# Patient Record
(demographics unavailable — no encounter records)

---

## 2024-10-09 NOTE — HISTORY OF PRESENT ILLNESS
[FreeTextEntry1] : Pt is here for follow up of multiple medical problems including Afib  anxiety and depression,   GERD,   IBS.

## 2024-12-19 NOTE — HISTORY OF PRESENT ILLNESS
[FreeTextEntry1] : eczema, spots [de-identified] : BENJAMIN ANGEL is a 71 year old F who present for f/u as below.   #Eczema, on/off flares, using mometasone PRN #Acne scarring on cheeks, pt bothered by textural changes.  #Asking about marionette line filler #FBSE.  Spots scattered on body x years. Asymptomatic and unchanged. No alleviating/aggravating factors. Never been treated.    Personal hx of skin cancer: no FHx of skin cancer: no Social hx: retired

## 2024-12-19 NOTE — PHYSICAL EXAM
[Alert] : alert [Oriented x 3] : ~L oriented x 3 [Well Nourished] : well nourished [Conjunctiva Non-injected] : conjunctiva non-injected [No Visual Lymphadenopathy] : no visual  lymphadenopathy [No Clubbing] : no clubbing [No Edema] : no edema [No Bromhidrosis] : no bromhidrosis [No Chromhidrosis] : no chromhidrosis [Full Body Skin Exam Performed] : performed [FreeTextEntry3] : General: Alert and oriented, in NAD.  All of the following were examined and were within normal limits, except as noted:   Scalp: Face, including eyelids, nose, lips, ears, oropharynx: Neck: Chest/Back/Abdomen: Bilateral Arms/Hands: Bilateral Legs/Feet: Buttocks, Genitalia, Anus/perineum:  	 Hair, Nails, Oral Mucosa, Eyes:    acneiform scars with textural changes bl cheeks eczematous thin plaques on R arm and posterior thighs stuck on waxy brown papules  red papules

## 2024-12-19 NOTE — ASSESSMENT
[FreeTextEntry1] : #Acne scarring, bl cheeks - chronic; flaring #Rhytides/skincare/textural changes - Diagnosis, chronic nature, disease course, treatment options and goals of therapy discussed - Start tretinoin 0.025% cream qhs as tolerated. SED, moisturize for dryness - Discussed Fraxel resurfacing; pt defers due to OOP cost  #Eczema /atopic derm - chronic; flaring #Xerosis Cutis - Diagnosis, chronic nature, disease course, treatment options and goals of therapy discussed - Moisturize whole body 2-3x daily with thick cream (rather than lotion). Examples include OTC La Roche Posay, Vanicream, Cetaphil, Cerave - Continue mometasone ointment BID PRN x2 weeks on, 1 week off cycles. SED, avoid on face  #Seborrheic Keratosis - These growths are benign - Related to genetics - these lesions run in families; NOT related to sun exposure - No treatment warranted unless inflamed; can use OTC Sarna lotion PRN itch  #Angiomas - Clinically benign red or purple blood vessel growths - No treatment warranted  #Screening exam for skin cancer - no suspicious lesions on exam today - TBSE performed today - Advised sun protection. Recommended OTC sunscreen products (EltaMD/Neutrogena/La Roche Posay), including SPF30+ with broadband UV protection as well as proper use. Discussed OTC sun protective clothing - Counseled patient to monitor for changes, including mole monitoring and self-skin exams

## 2025-02-07 NOTE — PHYSICAL EXAM
[Alert] : alert [Normal Voice/Communication] : normal voice/communication [Healthy Appearing] : healthy appearing [Sclera] : the sclera and conjunctiva were normal [Hearing Threshold Finger Rub Not [place]] : hearing was normal [Normal Lips/Gums] : the lips and gums were normal [Normal Appearance] : the appearance of the neck was normal [No Respiratory Distress] : no respiratory distress [Auscultation Breath Sounds / Voice Sounds] : lungs were clear to auscultation bilaterally [Heart Rate And Rhythm] : heart rate was normal and rhythm regular [Normal S1, S2] : normal S1 and S2 [Bowel Sounds] : normal bowel sounds [Abdomen Tenderness] : non-tender [Abnormal Walk] : normal gait [Abdomen Soft] : soft [Normal Color / Pigmentation] : normal skin color and pigmentation [Oriented To Time, Place, And Person] : oriented to person, place, and time

## 2025-02-07 NOTE — PHYSICAL EXAM
[Alert] : alert [Normal Voice/Communication] : normal voice/communication [Healthy Appearing] : healthy appearing [Sclera] : the sclera and conjunctiva were normal [Hearing Threshold Finger Rub Not [place]] : hearing was normal [Normal Lips/Gums] : the lips and gums were normal [Normal Appearance] : the appearance of the neck was normal [No Respiratory Distress] : no respiratory distress [Auscultation Breath Sounds / Voice Sounds] : lungs were clear to auscultation bilaterally [Heart Rate And Rhythm] : heart rate was normal and rhythm regular [Normal S1, S2] : normal S1 and S2 [Bowel Sounds] : normal bowel sounds [Abdomen Tenderness] : non-tender [Abnormal Walk] : normal gait [Abdomen Soft] : soft [Normal Color / Pigmentation] : normal skin color and pigmentation [Oriented To Time, Place, And Person] : oriented to person, place, and time

## 2025-02-16 NOTE — HISTORY OF PRESENT ILLNESS
[FreeTextEntry1] : Geno Herrera is a 71-year-old female presents to the office for alternating bowel habits. Pt reports episodes of incomplete emptying, hard stools to diarrhea. Associated abdominal cramping, bloating, and gas. No bowel regimen in place. Previous colonoscopy in 2022. Denies significant straining or overt bleeding such as melena or hematochezia. Denies upper GI symptoms such as GERD, nausea, vomiting, or dysphagia. Denies unintentional weight loss.

## 2025-02-16 NOTE — ASSESSMENT
[FreeTextEntry1] : 71-year-old female presents to the office for alternating bowel habits. Pt reports episodes of incomplete emptying, hard stools to diarrhea. Associated abdominal cramping, bloating, and gas.  Plan:  Alternating bowel habits: Will need to establish bowel regimen. Will have pt begin with supplemental fiber daily. Advised miralax is titratable, can move up or down on one capful as needed for bowel movements.  Abdominal cramping/bloating/gas: Likely related incomplete emptying and residual stool in colon. Will likely resolve once appropriate bowel regimen is maintained.   P to have four week follow up visit in office for evaluation on regimen. If ineffective will need to consider alternative pharmacologic remedies. Pt agrees to plan, all questions answered. Seen with Dr. Mercado.    I, Dr. Mercado, personally performed the evaluation and management (E/M) services for this established patient who presents today with (a) new problem(s)/exacerbation of (an) existing condition(s). That E/M includes conducting the examination, assessing all new/exacerbated conditions, and establishing a new plan of care. Today, my NPP, Jazmine Peterson, was here to observe my evaluation and management services for this new problem/exacerbated condition to be followed going forward.

## 2025-02-16 NOTE — REVIEW OF SYSTEMS
[As Noted in HPI] : as noted in HPI [Abdominal Pain] : abdominal pain [Constipation] : constipation [Diarrhea] : diarrhea [Bloating (gassiness)] : bloating [Negative] : Heme/Lymph [Vomiting] : no vomiting [Heartburn] : no heartburn [Melena (black stool)] : no melena [Bleeding] : no bleeding [Fecal Incontinence (soiling)] : no fecal incontinence

## 2025-03-10 NOTE — PHYSICAL EXAM
[Alert] : alert [Normal Voice/Communication] : normal voice/communication [Healthy Appearing] : healthy appearing [Sclera] : the sclera and conjunctiva were normal [Hearing Threshold Finger Rub Not [place]] : hearing was normal [Normal Lips/Gums] : the lips and gums were normal [Normal Appearance] : the appearance of the neck was normal [No Respiratory Distress] : no respiratory distress [Auscultation Breath Sounds / Voice Sounds] : lungs were clear to auscultation bilaterally [Heart Rate And Rhythm] : heart rate was normal and rhythm regular [Normal S1, S2] : normal S1 and S2 [Bowel Sounds] : normal bowel sounds [Abdomen Tenderness] : non-tender [Abdomen Soft] : soft [Abnormal Walk] : normal gait [Normal Color / Pigmentation] : normal skin color and pigmentation [Oriented To Time, Place, And Person] : oriented to person, place, and time

## 2025-03-10 NOTE — PHYSICAL EXAM
[Alert] : alert [Normal Voice/Communication] : normal voice/communication [Healthy Appearing] : healthy appearing [Sclera] : the sclera and conjunctiva were normal [Hearing Threshold Finger Rub Not [place]] : hearing was normal [Normal Lips/Gums] : the lips and gums were normal [Normal Appearance] : the appearance of the neck was normal [No Respiratory Distress] : no respiratory distress [Auscultation Breath Sounds / Voice Sounds] : lungs were clear to auscultation bilaterally [Heart Rate And Rhythm] : heart rate was normal and rhythm regular [Normal S1, S2] : normal S1 and S2 [Bowel Sounds] : normal bowel sounds [Abdomen Tenderness] : non-tender [Abdomen Soft] : soft [Abnormal Walk] : normal gait [Normal Color / Pigmentation] : normal skin color and pigmentation [Oriented To Time, Place, And Person] : oriented to person, place, and time

## 2025-03-11 NOTE — ASSESSMENT
[FreeTextEntry1] : 71-year-old female presents for follow up on IBS.   Plan:  Fluctuating bowel habits: Discussed trial of linzess, but pt wishes to hold off for concern of diarrhea. Pt to continue increasing dietary fiber and will trial alternative supplemental fiber OTC.  Discussed low fod-map diet. Gas and bloating likely triggered by diet, pt to try OTC supplements of gas-x or beano to help with symptom relief.  Pt has upcoming appointment with uro-gynecology this week. If no imaging ordered discussed possible CT vs repeat colonoscopy for further evaluation.   Pt to call if symptoms worsen. Pt agrees to plan, all questions answered. Discussed with Dr. Garcia.   I, Dr. aGrcia, personally performed the evaluation and management (E/M) services for this established patient who presents today with (a) new problem(s)/exacerbation of (an) existing condition(s). That E/M includes conducting the examination, assessing all new/exacerbated conditions, and establishing a new plan of care. Today, my NP Jazmine Peterson, was here to observe my evaluation and management services for this new problem/exacerbated condition to be followed going forward.

## 2025-03-11 NOTE — HISTORY OF PRESENT ILLNESS
[FreeTextEntry1] : Geno Herrera is a 71-year-old female presents to the office for follow up. Pt has been experiencing fluctuating bowel habits. Pt tried both miralax and fiber, however started to experience significant bloating with use of miralax. Pt stopped, continued with fiber independently. Pt then began to notice increase of gas with use of supplemental benefiber. Pt only continued with increase in dietary fiber, and states bowel movements with dietary fiber increase but in the absence, no bowel movements. Pt does notice triggering symptoms depending on food consumed. Pt also reports abdominal cramping, feels her symptoms are exacerbated, but admits to personal stress. Pt also battling uterine prolapse. Denies overt bleeding such as melena or hematochezia. Denies upper GI symptoms such as GERD, nausea, vomiting, or dysphagia. Denies unintentional weight loss.

## 2025-03-11 NOTE — ASSESSMENT
[FreeTextEntry1] : 71-year-old female presents for follow up on IBS.   Plan:  Fluctuating bowel habits: Discussed trial of linzess, but pt wishes to hold off for concern of diarrhea. Pt to continue increasing dietary fiber and will trial alternative supplemental fiber OTC.  Discussed low fod-map diet. Gas and bloating likely triggered by diet, pt to try OTC supplements of gas-x or beano to help with symptom relief.  Pt has upcoming appointment with uro-gynecology this week. If no imaging ordered discussed possible CT vs repeat colonoscopy for further evaluation.   Pt to call if symptoms worsen. Pt agrees to plan, all questions answered. Discussed with Dr. Garcia.   I, Dr. Garcia, personally performed the evaluation and management (E/M) services for this established patient who presents today with (a) new problem(s)/exacerbation of (an) existing condition(s). That E/M includes conducting the examination, assessing all new/exacerbated conditions, and establishing a new plan of care. Today, my NP Jazmine Peterson, was here to observe my evaluation and management services for this new problem/exacerbated condition to be followed going forward.

## 2025-04-14 NOTE — HEALTH RISK ASSESSMENT
[No] : In the past 12 months have you used drugs other than those required for medical reasons? No [0] : 2) Feeling down, depressed, or hopeless: Not at all (0) [Never] : Never [No falls in past year] : Patient reported no falls in the past year [PHQ-2 Negative - No further assessment needed] : PHQ-2 Negative - No further assessment needed [Yes] : takes [NO] : No [None] : None [With Family] : lives with family [Single] : single [Fully functional (bathing, dressing, toileting, transferring, walking, feeding)] : Fully functional (bathing, dressing, toileting, transferring, walking, feeding) [Fully functional (using the telephone, shopping, preparing meals, housekeeping, doing laundry, using] : Fully functional and needs no help or supervision to perform IADLs (using the telephone, shopping, preparing meals, housekeeping, doing laundry, using transportation, managing medications and managing finances) [Smoke Detector] : smoke detector [Carbon Monoxide Detector] : carbon monoxide detector [Seat Belt] :  uses seat belt [Sunscreen] : uses sunscreen [With Patient/Caregiver] : , with patient/caregiver [de-identified] : walking [de-identified] : no [PBZ2Xvhbp] : 0 [EyeExamDate] : 04/25 [Change in mental status noted] : No change in mental status noted [Reports changes in hearing] : Reports no changes in hearing [Reports changes in dental health] : Reports no changes in dental health [MammogramDate] : 12/24 [PapSmearDate] : 09/23 [BoneDensityDate] : 09/21 [ColonoscopyDate] : 01/22 [AdvancecareDate] : 4/1/4/25

## 2025-04-14 NOTE — ASSESSMENT
[FreeTextEntry1] : All preventative measures were reviewed with the patient and the patient is due for and agrees to the following as outlined  in the plan  below.  Will go the lab when fasting for blood work. [Vaccines Reviewed] : Immunizations reviewed today. Please see immunization details in the vaccine log within the immunization flowsheet.

## 2025-07-01 NOTE — PHYSICAL EXAM
[Chaperoned Physical Exam] : A chaperone was present in the examining room during all aspects of the physical examination. [No Acute Distress] : in no acute distress [Well developed] : well developed [Well Nourished] : ~L well nourished [Oriented x3] : oriented to person, place, and time [Normal Memory] : ~T memory was ~L unimpaired [Bulbocavernous] : bulbocavernous was present [Anal Reflex] : the anal reflex was present [Warm and Dry] : was warm and dry to touch [Normal Gait] : gait was normal [Vulvar Atrophy] : vulvar atrophy [Labia Majora] : were normal [Normal Appearance] : general appearance was normal [Atrophy] : atrophy [Aa ____] : Aa [unfilled] [Ba ____] : Ba [unfilled] [C ____] : C [unfilled] [GH ____] : GH [unfilled] [PB ____] : PB [unfilled] [TVL ____] : TVL  [unfilled] [Ap ____] : Ap [unfilled] [Bp ____] : Bp [unfilled] [D ____] : D [unfilled] [Normal] : no abnormalities [Tenderness] : ~T no ~M abdominal tenderness observed [Distended] : not distended [Inguinal LAD] : no adenopathy was noted in the inguinal lymph nodes

## 2025-07-01 NOTE — HISTORY OF PRESENT ILLNESS
[Rectal Prolapse] : moderate [Unable To Restrain Bowel Movement] : no [Feelings Of Urinary Urgency] : no [x1] : nocturia once nightly [Urinary Tract Infection] : mild [Stool Visible Blood] : no [Incomplete Emptying Of Stool] : no [de-identified] : 5x [FreeTextEntry1] :  h/o tanyaibb on eliquis h/o cholecystectomy

## 2025-07-01 NOTE — DISCUSSION/SUMMARY
[FreeTextEntry1] :  We reviewed management options for her prolapse including: observation, pelvic floor exercises with and without PT, pessary, and surgical management. We reviewed various surgical management options including vaginal, laparoscopic/robotic, abdominal procedures. We reviewed procedures involving hysterectomy as well as uterine sparing procedures. We also reviewed both mesh and non-mesh options. We reviewed risks of each procedure including bleeding, infection,  injury to surrounding organs as well as potential mesh complications. Written IUGA information on prolapse treatment options was also provided to her to review. She would like to try a pessary and will RTO for pessary fitting. IUGA information on pessaries provided.